# Patient Record
Sex: FEMALE | Race: WHITE | NOT HISPANIC OR LATINO
[De-identification: names, ages, dates, MRNs, and addresses within clinical notes are randomized per-mention and may not be internally consistent; named-entity substitution may affect disease eponyms.]

---

## 2017-09-28 ENCOUNTER — APPOINTMENT (OUTPATIENT)
Dept: SURGERY | Facility: CLINIC | Age: 57
End: 2017-09-28

## 2017-09-28 VITALS
BODY MASS INDEX: 26.13 KG/M2 | SYSTOLIC BLOOD PRESSURE: 114 MMHG | HEIGHT: 62 IN | DIASTOLIC BLOOD PRESSURE: 76 MMHG | HEART RATE: 62 BPM | TEMPERATURE: 98.6 F | OXYGEN SATURATION: 98 % | WEIGHT: 142 LBS

## 2017-11-03 ENCOUNTER — OUTPATIENT (OUTPATIENT)
Dept: OUTPATIENT SERVICES | Facility: HOSPITAL | Age: 57
LOS: 1 days | End: 2017-11-03
Payer: COMMERCIAL

## 2017-11-03 VITALS
OXYGEN SATURATION: 98 % | HEIGHT: 60 IN | TEMPERATURE: 98 F | WEIGHT: 138.89 LBS | DIASTOLIC BLOOD PRESSURE: 68 MMHG | RESPIRATION RATE: 18 BRPM | SYSTOLIC BLOOD PRESSURE: 110 MMHG | HEART RATE: 62 BPM

## 2017-11-03 DIAGNOSIS — Z01.818 ENCOUNTER FOR OTHER PREPROCEDURAL EXAMINATION: ICD-10-CM

## 2017-11-03 DIAGNOSIS — Z98.891 HISTORY OF UTERINE SCAR FROM PREVIOUS SURGERY: Chronic | ICD-10-CM

## 2017-11-03 DIAGNOSIS — Z98.51 TUBAL LIGATION STATUS: Chronic | ICD-10-CM

## 2017-11-03 DIAGNOSIS — K43.2 INCISIONAL HERNIA WITHOUT OBSTRUCTION OR GANGRENE: ICD-10-CM

## 2017-11-03 DIAGNOSIS — Z98.890 OTHER SPECIFIED POSTPROCEDURAL STATES: Chronic | ICD-10-CM

## 2017-11-03 PROCEDURE — G0463: CPT

## 2017-11-03 NOTE — H&P PST ADULT - RS GEN PE MLT RESP DETAILS PC
normal/respirations non-labored/airway patent/no chest wall tenderness/no rhonchi/no rales/good air movement/breath sounds equal/no subcutaneous emphysema/no intercostal retractions/no wheezes/clear to auscultation bilaterally

## 2017-11-03 NOTE — H&P PST ADULT - PSH
History of arthroscopy of both shoulders    History of  section H/O tubal ligation    History of arthroscopy of both shoulders    History of  section

## 2017-11-03 NOTE — H&P PST ADULT - NEGATIVE CARDIOVASCULAR SYMPTOMS
no chest pain/no claudication/no dyspnea on exertion/no orthopnea/no peripheral edema/no paroxysmal nocturnal dyspnea/no palpitations

## 2017-11-03 NOTE — H&P PST ADULT - NEGATIVE GASTROINTESTINAL SYMPTOMS
no change in bowel habits/no flatulence/no diarrhea/no vomiting/no constipation/no nausea/no hematochezia

## 2017-11-03 NOTE — H&P PST ADULT - GASTROINTESTINAL DETAILS
normal/nontender/no distention/soft/no masses palpable/bowel sounds normal bowel sounds normal/normal/no distention/soft/nontender

## 2017-11-03 NOTE — H&P PST ADULT - NEGATIVE ALLERGY TYPES
no reactions to animals/no reactions to medicines/no indoor environmental allergies/no reactions to insect bites

## 2017-11-03 NOTE — H&P PST ADULT - ASSESSMENT
56 yr old female with PMH seasonal allergies, osteoporosis, vertigo, migraine headache, dislocated cervical disc and lumbar herniated disc presents with incisional hernia.  Pt for repair of incisional hernia on 11/10/2017. Pt is at low risk for procedure.

## 2017-11-03 NOTE — H&P PST ADULT - LOCATION OF BACK PAIN
lumbar spine/shoulder R/shoulder L/cervical spine shoulder R/due to dislocated disc and herniated disc/cervical spine/shoulder L/lumbar spine

## 2017-11-03 NOTE — H&P PST ADULT - HISTORY OF PRESENT ILLNESS
56 yr old female with PMH seasonal allergies, osteoporosis, vertigo, migraine headache, dislocated cervical disc and lumbar herniated disc presents with c/o umbilical hernia for 2 years that has been increasing in size and causing discomfort. Pt for repair of incisional hernia on 11/10/2017.

## 2017-11-10 ENCOUNTER — OUTPATIENT (OUTPATIENT)
Dept: OUTPATIENT SERVICES | Facility: HOSPITAL | Age: 57
LOS: 1 days | End: 2017-11-10
Payer: COMMERCIAL

## 2017-11-10 ENCOUNTER — RESULT REVIEW (OUTPATIENT)
Age: 57
End: 2017-11-10

## 2017-11-10 VITALS
OXYGEN SATURATION: 98 % | HEART RATE: 60 BPM | TEMPERATURE: 98 F | SYSTOLIC BLOOD PRESSURE: 112 MMHG | DIASTOLIC BLOOD PRESSURE: 65 MMHG | RESPIRATION RATE: 16 BRPM

## 2017-11-10 VITALS
TEMPERATURE: 98 F | DIASTOLIC BLOOD PRESSURE: 57 MMHG | SYSTOLIC BLOOD PRESSURE: 101 MMHG | WEIGHT: 138.89 LBS | RESPIRATION RATE: 18 BRPM | OXYGEN SATURATION: 97 % | HEART RATE: 55 BPM | HEIGHT: 60 IN

## 2017-11-10 DIAGNOSIS — Z01.818 ENCOUNTER FOR OTHER PREPROCEDURAL EXAMINATION: ICD-10-CM

## 2017-11-10 DIAGNOSIS — Z98.51 TUBAL LIGATION STATUS: Chronic | ICD-10-CM

## 2017-11-10 DIAGNOSIS — K43.2 INCISIONAL HERNIA WITHOUT OBSTRUCTION OR GANGRENE: ICD-10-CM

## 2017-11-10 DIAGNOSIS — Z98.891 HISTORY OF UTERINE SCAR FROM PREVIOUS SURGERY: Chronic | ICD-10-CM

## 2017-11-10 DIAGNOSIS — Z98.890 OTHER SPECIFIED POSTPROCEDURAL STATES: Chronic | ICD-10-CM

## 2017-11-10 PROCEDURE — 88302 TISSUE EXAM BY PATHOLOGIST: CPT

## 2017-11-10 PROCEDURE — 49585: CPT | Mod: AS

## 2017-11-10 PROCEDURE — 88302 TISSUE EXAM BY PATHOLOGIST: CPT | Mod: 26

## 2017-11-10 PROCEDURE — 17999 UNLISTD PX SKN MUC MEMB SUBQ: CPT

## 2017-11-10 PROCEDURE — 49585: CPT

## 2017-11-10 RX ORDER — OXYCODONE AND ACETAMINOPHEN 5; 325 MG/1; MG/1
1 TABLET ORAL EVERY 4 HOURS
Qty: 0 | Refills: 0 | Status: DISCONTINUED | OUTPATIENT
Start: 2017-11-10 | End: 2017-11-10

## 2017-11-10 RX ORDER — ONDANSETRON 8 MG/1
4 TABLET, FILM COATED ORAL ONCE
Qty: 0 | Refills: 0 | Status: DISCONTINUED | OUTPATIENT
Start: 2017-11-10 | End: 2017-11-10

## 2017-11-10 RX ORDER — ALENDRONATE SODIUM 70 MG/1
1 TABLET ORAL
Qty: 0 | Refills: 0 | COMMUNITY

## 2017-11-10 RX ORDER — ACETAMINOPHEN WITH CODEINE 300MG-30MG
1 TABLET ORAL
Qty: 24 | Refills: 0 | OUTPATIENT
Start: 2017-11-10

## 2017-11-10 RX ORDER — GABAPENTIN 400 MG/1
1 CAPSULE ORAL
Qty: 0 | Refills: 0 | COMMUNITY

## 2017-11-10 RX ORDER — MECLIZINE HCL 12.5 MG
1 TABLET ORAL
Qty: 0 | Refills: 0 | COMMUNITY

## 2017-11-10 RX ORDER — ACETAMINOPHEN 500 MG
2 TABLET ORAL
Qty: 0 | Refills: 0 | COMMUNITY

## 2017-11-10 RX ORDER — HYDROMORPHONE HYDROCHLORIDE 2 MG/ML
0.5 INJECTION INTRAMUSCULAR; INTRAVENOUS; SUBCUTANEOUS
Qty: 0 | Refills: 0 | Status: DISCONTINUED | OUTPATIENT
Start: 2017-11-10 | End: 2017-11-10

## 2017-11-10 RX ORDER — FEXOFENADINE HCL 30 MG
1 TABLET ORAL
Qty: 0 | Refills: 0 | COMMUNITY

## 2017-11-10 NOTE — BRIEF OPERATIVE NOTE - PROCEDURE
<<-----Click on this checkbox to enter Procedure Incisional hernia repair  11/10/2017  with excess skin removal  Active  ASIF

## 2017-11-10 NOTE — ASU DISCHARGE PLAN (ADULT/PEDIATRIC). - NOTIFY
Unable to Urinate/Pain not relieved by Medications/Bleeding that does not stop/Persistent Nausea and Vomiting

## 2017-11-10 NOTE — ASU DISCHARGE PLAN (ADULT/PEDIATRIC). - MEDICATION SUMMARY - MEDICATIONS TO TAKE
I will START or STAY ON the medications listed below when I get home from the hospital:    Tylenol with Codeine #4 oral tablet  -- 1 tab(s) by mouth every 6 hours MDD:4  -- Caution federal law prohibits the transfer of this drug to any person other  than the person for whom it was prescribed.  May cause drowsiness.  Alcohol may intensify this effect.  Use care when operating dangerous machinery.  This product contains acetaminophen.  Do not use  with any other product containing acetaminophen to prevent possible liver damage.  Using more of this medication than prescribed may cause serious breathing problems.    -- Indication: For as directed    gabapentin 300 mg oral capsule  -- 1 cap(s) by mouth 3 times a day, As Needed  -- Indication: For as directed    meclizine 12.5 mg oral tablet  -- 1 tab(s) by mouth every 6 hours, As Needed  -- Indication: For as directed    alendronate 35 mg oral tablet  -- 1 tab(s) by mouth once a week  -- Indication: For as directed    Oyster Shell Calcium with Vitamin D 250 mg-125 intl units oral tablet  -- 1 tab(s) by mouth 2 times a day  -- Indication: For as directed

## 2017-11-11 ENCOUNTER — TRANSCRIPTION ENCOUNTER (OUTPATIENT)
Age: 57
End: 2017-11-11

## 2017-11-14 LAB — SURGICAL PATHOLOGY FINAL REPORT - CH: SIGNIFICANT CHANGE UP

## 2019-03-08 PROBLEM — R42 DIZZINESS AND GIDDINESS: Chronic | Status: ACTIVE | Noted: 2017-11-03

## 2019-03-08 PROBLEM — M81.0 AGE-RELATED OSTEOPOROSIS WITHOUT CURRENT PATHOLOGICAL FRACTURE: Chronic | Status: ACTIVE | Noted: 2017-11-03

## 2019-03-08 PROBLEM — J30.2 OTHER SEASONAL ALLERGIC RHINITIS: Chronic | Status: ACTIVE | Noted: 2017-11-03

## 2019-03-08 PROBLEM — K43.2 INCISIONAL HERNIA WITHOUT OBSTRUCTION OR GANGRENE: Chronic | Status: ACTIVE | Noted: 2017-11-03

## 2019-03-14 ENCOUNTER — APPOINTMENT (OUTPATIENT)
Dept: SURGERY | Facility: CLINIC | Age: 59
End: 2019-03-14
Payer: COMMERCIAL

## 2019-03-14 VITALS
DIASTOLIC BLOOD PRESSURE: 69 MMHG | HEART RATE: 77 BPM | BODY MASS INDEX: 23.05 KG/M2 | SYSTOLIC BLOOD PRESSURE: 104 MMHG | WEIGHT: 126 LBS | TEMPERATURE: 98.5 F

## 2019-03-14 PROCEDURE — 99213 OFFICE O/P EST LOW 20 MIN: CPT

## 2019-07-22 NOTE — H&P PST ADULT - FAMILY HISTORY
Addended by: SUKHJINDER KELLER on: 7/22/2019 07:51 AM     Modules accepted: Jessica     Father  Still living? No  Family history of heart attack, Age at diagnosis: Age Unknown     Mother  Still living? No  Family history of diabetes mellitus (DM), Age at diagnosis: Age Unknown  Family history of breast cancer in mother, Age at diagnosis: Age Unknown     Sibling  Still living? No  Family history of lung cancer, Age at diagnosis: Age Unknown     Grandparent  Still living? No  Family history of diabetes mellitus (DM), Age at diagnosis: Age Unknown

## 2020-08-13 ENCOUNTER — APPOINTMENT (OUTPATIENT)
Dept: SURGERY | Facility: CLINIC | Age: 60
End: 2020-08-13
Payer: COMMERCIAL

## 2020-08-13 VITALS
WEIGHT: 131 LBS | SYSTOLIC BLOOD PRESSURE: 103 MMHG | DIASTOLIC BLOOD PRESSURE: 71 MMHG | HEART RATE: 93 BPM | HEIGHT: 62 IN | OXYGEN SATURATION: 99 % | TEMPERATURE: 97 F | BODY MASS INDEX: 24.11 KG/M2

## 2020-08-13 DIAGNOSIS — R10.31 RIGHT LOWER QUADRANT PAIN: ICD-10-CM

## 2020-08-13 PROCEDURE — 99214 OFFICE O/P EST MOD 30 MIN: CPT

## 2020-09-02 NOTE — END OF VISIT
[FreeTextEntry3] : I saw and evaluated the patient. I agree with the findings and the plan of care as documented in the  NP note.

## 2020-09-02 NOTE — VITALS
[de-identified] : 4/10 [FreeTextEntry3] : right groin  [FreeTextEntry1] : rest  [FreeTextEntry2] : changing position

## 2020-09-02 NOTE — PLAN
[FreeTextEntry1] : Ms. CHAO presenting with right groin pain .   no signs of hernia. pain could be due to the back or hip problems. she  was told significance of findings, options, risks and benefits were explained.   she was advised to go to her PMD or orthopedic specialist \par Patient advised to seek immediate medical attention with any acute change in symptoms or with the development of any new or worsening symptoms.  Patient's questions and concerns addressed to patient's satisfaction, and patient verbalized an understanding of the information discussed.\par \par

## 2020-09-02 NOTE — HISTORY OF PRESENT ILLNESS
[de-identified] : Ms. DALIA CHAO is  a 59 year old female returns kimber the office with the chief complaint of having pain a  in her Right  groin.  She has had the condition for  20 days  and is worse when she sits down or gets up.  She denies any fever or  night sweats. Appetite is good  and weight is stable.   \par  \par

## 2020-09-02 NOTE — ASSESSMENT
[FreeTextEntry1] : Impression: right groin pain  no signs of hernia. pain could be due to the back or hip problems.

## 2020-09-02 NOTE — PHYSICAL EXAM
[Alert] : alert [Oriented to Person] : oriented to person [Oriented to Place] : oriented to place [Oriented to Time] : oriented to time [Calm] : calm [Abdominal Masses] : No abdominal masses [Abdomen Tenderness] : ~T ~M No abdominal tenderness [de-identified] : She  is alert, well-groomed, and cheerful.\par   [de-identified] :   anicteric.  Nasal mucosa pink, septum midline. Oral mucosa pink.  Tongue midline, Pharynx without exudates.\par   [de-identified] :  abdominoplasty scar well healed   No evidence of hernia

## 2022-03-21 ENCOUNTER — NON-APPOINTMENT (OUTPATIENT)
Age: 62
End: 2022-03-21

## 2022-03-21 DIAGNOSIS — K31.9 DISEASE OF STOMACH AND DUODENUM, UNSPECIFIED: ICD-10-CM

## 2022-04-29 ENCOUNTER — NON-APPOINTMENT (OUTPATIENT)
Age: 62
End: 2022-04-29

## 2022-08-11 ENCOUNTER — OUTPATIENT (OUTPATIENT)
Dept: OUTPATIENT SERVICES | Facility: HOSPITAL | Age: 62
LOS: 1 days | Discharge: HOME | End: 2022-08-11

## 2022-08-11 ENCOUNTER — APPOINTMENT (OUTPATIENT)
Dept: GASTROENTEROLOGY | Facility: HOSPITAL | Age: 62
End: 2022-08-11

## 2022-08-11 ENCOUNTER — RESULT REVIEW (OUTPATIENT)
Age: 62
End: 2022-08-11

## 2022-08-11 VITALS
TEMPERATURE: 97 F | HEART RATE: 59 BPM | DIASTOLIC BLOOD PRESSURE: 75 MMHG | SYSTOLIC BLOOD PRESSURE: 121 MMHG | OXYGEN SATURATION: 96 % | RESPIRATION RATE: 14 BRPM

## 2022-08-11 VITALS
OXYGEN SATURATION: 93 % | SYSTOLIC BLOOD PRESSURE: 122 MMHG | DIASTOLIC BLOOD PRESSURE: 80 MMHG | RESPIRATION RATE: 14 BRPM | HEART RATE: 67 BPM

## 2022-08-11 DIAGNOSIS — Z98.891 HISTORY OF UTERINE SCAR FROM PREVIOUS SURGERY: Chronic | ICD-10-CM

## 2022-08-11 DIAGNOSIS — K31.9 DISEASE OF STOMACH AND DUODENUM, UNSPECIFIED: ICD-10-CM

## 2022-08-11 DIAGNOSIS — Z98.51 TUBAL LIGATION STATUS: Chronic | ICD-10-CM

## 2022-08-11 DIAGNOSIS — Z98.890 OTHER SPECIFIED POSTPROCEDURAL STATES: Chronic | ICD-10-CM

## 2022-08-11 PROCEDURE — 43259 EGD US EXAM DUODENUM/JEJUNUM: CPT | Mod: GC

## 2022-08-11 PROCEDURE — 43239 EGD BIOPSY SINGLE/MULTIPLE: CPT | Mod: GC

## 2022-08-11 PROCEDURE — 88305 TISSUE EXAM BY PATHOLOGIST: CPT | Mod: 26

## 2022-08-11 NOTE — ASU PREOP CHECKLIST - WEIGHT IN KG
Progress Note      Patient Name: Valentina Stevens   Patient ID: [id number]   Sex: Female   YOB: 1961    Referring Provider: Rosalva BENZ    Visit Date: Bernadine 3, 2019    Provider: Eduardo Adames MD   Location: [facility], [place], operated by [facility]   Location Address: [address] [place] [address] EFRAIN Lange  [id number]   Location Phone: [phone]          Chief Complaint     sore throat for 2 days       History Of Present Illness  Valentina Stevens is a 57 year old /White female who presents for evaluation and treatment of:      cold symptoms x 2 days- sudden onset- worsening symptoms- otc meds not helping       Past Medical History  Disease Name Date Onset Notes   Depression --  --    Diverticulitis Of Colon --  --    Dysuria --  --    External hemorrhoid --  --    GERD (gastroesophageal reflux disease) --  --    Hearing loss --  --    Hematuria --  --    Hypothyroidism --  --    Menopause --  --    Murmur --  --    Osteoporosis --  --    Rash of Skin --  --    Scoliosis --  --    Vitamin D Deficiency --  --          Past Surgical History  Procedure Name Date Notes   Tubal ligation --  --          Medication List  Name Date Started Instructions   Calcium 600 + D(3) 600 mg(1,500mg) -400 unit oral tablet 07/16/2018 take 1 tablet by oral route 2 times a day   levothyroxine 75 mcg oral tablet 03/18/2019 TAKE ONE TABLET BY MOUTH DAILY   Mobic 15 mg oral tablet 05/20/2019 take 1 tablet (15 mg) by oral route once daily for 10 days   multivitamin oral tablet  --    Tylenol 325 mg oral capsule  --          Allergy List  Allergen Name Date Reaction Notes   Cephalexin adverse reaction --  --  --    PENICILLINS --  --  --    SULFA (SULFONAMIDES) --  --  --          Social History  Finding Status Start/Stop Quantity Notes   Alcohol use --  --/-- --  --    Tobacco Never --/-- --  NEVER SMOKER         Immunizations  NameDate Admin Mfg Trade Name Lot Number Route Inj VIS Given VIS Publication   HepA12/20/2018  MSD VAQTA-ADULT [id number]  RA 12/20/2018 01/01/2016   Comments: [id number]   HepA05/17/2018 SKB HAVRIX-ADULT [id number] IM  05/17/2018 07/21/2016   Comments: [id number]   Wbwvyrpgn46/09/2018 PMC FLUARIX [id number] IM  10/09/2018 08/07/2015   Comments: NDC [id number]   Ezsnlcswl12/17/2018 UNK Unknown TradeName [id number] IM  01/17/2018 08/07/2015   Comments: ndc 02512-707-34   Tdap03/20/2018 PMC ADACEL [id number] IM  03/20/2018 02/24/2015   Comments: ndc [id number]         Review of Systems  · Constitutional  o Admits  o : fever  o Denies  o : fatigue  · HENT  o Admits  o : sore throat  o Denies  o : nasal congestion, nasal discharge, ear pain  · Cardiovascular  o Denies  o : chest pain, palpitations  · Respiratory  o Denies  o : shortness of breath, cough      Vitals  Date Time BP Position Site L\R Cuff Size HR RR TEMP (F) WT  HT  BMI kg/m2 BSA m2 O2 Sat        06/03/2019 06:38 /80 Sitting    102 - R  98.3 143lbs 0oz    95 %          Physical Examination  · Constitutional  o Appearance  o : well developed, well-nourished, in no acute distress  · Head and Face  o HEENT  o : MMM, TM's bilaterally clear, erythema of throat, uvula midline, throat open, no abscesses seen  · Respiratory  o Respiratory Effort  o : breathing unlabored  o Auscultation of Lungs  o : clear to ascultation  · Cardiovascular  o Heart  o :   § Auscultation of Heart  § : regular rate and rhythm  o Peripheral Vascular System  o :   § Extremities  § : no edema  · Musculoskeletal  o General  o :   § General Musculoskeletal  § : No joint swelling or deformity., Muscle tone, strength, and development grossly normal.  · Neurologic  o Gait and Station  o :   § Gait Screening  § : normal gait  · Psychiatric  o Mood and Affect  o : mood normal, affect appropriate          Assessment  · Pharyngitis     462/J02.9      Plan  · Orders  o Throat culture and sensitivity (56732) - 462/J02.9 - 06/03/2019  o ACO-39: Current medications updated and reviewed () - -  06/03/2019  · Medications  o doxycycline monohydrate 100 mg oral tablet   SIG: take 1 tablet (100 mg) by oral route every 12 hours for 7 days   DISP: (14) tablets with 0 refills  Prescribed on 06/03/2019     · Instructions  o Patient was educated/instructed on their diagnosis, treatment and medications prior to discharge from the clinic today.  o pt told to wear sunscreen when out in sun and on doxycycline.            Electronically Signed by: Eduardo Adames MD -Author on Bernadine 3, 2019 06:50:27 PM   63.5

## 2022-08-11 NOTE — ASU PATIENT PROFILE, ADULT - ACCEPTABLE
Impression: Foreign body in cornea, right eye, initial encounter: T15.01xA. Plan: Removed foreign body in office with alex. Apply BSCL,  Patient educated. Start Ofloxacin QID OD x 1 week, then FU with Dr. Bre Seo on Monday. 5

## 2022-08-11 NOTE — ASU PATIENT PROFILE, ADULT - FALL HARM RISK - UNIVERSAL INTERVENTIONS
Bed in lowest position, wheels locked, appropriate side rails in place/Call bell, personal items and telephone in reach/Instruct patient to call for assistance before getting out of bed or chair/Non-slip footwear when patient is out of bed/[place] to call system/Physically safe environment - no spills, clutter or unnecessary equipment/Purposeful Proactive Rounding/Room/bathroom lighting operational, light cord in reach
